# Patient Record
Sex: FEMALE | HISPANIC OR LATINO | URBAN - METROPOLITAN AREA
[De-identification: names, ages, dates, MRNs, and addresses within clinical notes are randomized per-mention and may not be internally consistent; named-entity substitution may affect disease eponyms.]

---

## 2024-09-08 ENCOUNTER — HOSPITAL ENCOUNTER (EMERGENCY)
Facility: HOSPITAL | Age: 54
Discharge: HOME | End: 2024-09-08
Attending: EMERGENCY MEDICINE
Payer: COMMERCIAL

## 2024-09-08 ENCOUNTER — APPOINTMENT (OUTPATIENT)
Dept: RADIOLOGY | Facility: HOSPITAL | Age: 54
End: 2024-09-08
Payer: COMMERCIAL

## 2024-09-08 VITALS
WEIGHT: 169.75 LBS | BODY MASS INDEX: 33.33 KG/M2 | DIASTOLIC BLOOD PRESSURE: 60 MMHG | SYSTOLIC BLOOD PRESSURE: 114 MMHG | TEMPERATURE: 98.2 F | OXYGEN SATURATION: 98 % | HEART RATE: 77 BPM | HEIGHT: 60 IN | RESPIRATION RATE: 16 BRPM

## 2024-09-08 DIAGNOSIS — R42 DIZZINESS: ICD-10-CM

## 2024-09-08 DIAGNOSIS — R51.9 ACUTE NONINTRACTABLE HEADACHE, UNSPECIFIED HEADACHE TYPE: Primary | ICD-10-CM

## 2024-09-08 DIAGNOSIS — M77.9 TENDONITIS: ICD-10-CM

## 2024-09-08 PROBLEM — M79.604 RIGHT LEG PAIN: Status: ACTIVE | Noted: 2024-09-08

## 2024-09-08 LAB
ALBUMIN SERPL-MCNC: 3.7 G/DL (ref 3.5–5)
ALP BLD-CCNC: 114 U/L (ref 35–125)
ALT SERPL-CCNC: 50 U/L (ref 5–40)
ANION GAP SERPL CALC-SCNC: 10 MMOL/L
APPEARANCE UR: CLEAR
AST SERPL-CCNC: 31 U/L (ref 5–40)
BASOPHILS # BLD AUTO: 0.04 X10*3/UL (ref 0–0.1)
BASOPHILS NFR BLD AUTO: 0.6 %
BILIRUB SERPL-MCNC: 0.3 MG/DL (ref 0.1–1.2)
BILIRUB UR STRIP.AUTO-MCNC: NEGATIVE MG/DL
BUN SERPL-MCNC: 7 MG/DL (ref 8–25)
CALCIUM SERPL-MCNC: 8.8 MG/DL (ref 8.5–10.4)
CHLORIDE SERPL-SCNC: 108 MMOL/L (ref 97–107)
CO2 SERPL-SCNC: 20 MMOL/L (ref 24–31)
COLOR UR: COLORLESS
CREAT SERPL-MCNC: 0.4 MG/DL (ref 0.4–1.6)
EGFRCR SERPLBLD CKD-EPI 2021: >90 ML/MIN/1.73M*2
EOSINOPHIL # BLD AUTO: 0.13 X10*3/UL (ref 0–0.7)
EOSINOPHIL NFR BLD AUTO: 1.8 %
ERYTHROCYTE [DISTWIDTH] IN BLOOD BY AUTOMATED COUNT: 13.6 % (ref 11.5–14.5)
GLUCOSE SERPL-MCNC: 152 MG/DL (ref 65–99)
GLUCOSE UR STRIP.AUTO-MCNC: NORMAL MG/DL
HCT VFR BLD AUTO: 40.1 % (ref 36–46)
HGB BLD-MCNC: 13 G/DL (ref 12–16)
IMM GRANULOCYTES # BLD AUTO: 0.01 X10*3/UL (ref 0–0.7)
IMM GRANULOCYTES NFR BLD AUTO: 0.1 % (ref 0–0.9)
KETONES UR STRIP.AUTO-MCNC: NEGATIVE MG/DL
LEUKOCYTE ESTERASE UR QL STRIP.AUTO: NEGATIVE
LYMPHOCYTES # BLD AUTO: 1.81 X10*3/UL (ref 1.2–4.8)
LYMPHOCYTES NFR BLD AUTO: 25.6 %
MCH RBC QN AUTO: 29.2 PG (ref 26–34)
MCHC RBC AUTO-ENTMCNC: 32.4 G/DL (ref 32–36)
MCV RBC AUTO: 90 FL (ref 80–100)
MONOCYTES # BLD AUTO: 0.4 X10*3/UL (ref 0.1–1)
MONOCYTES NFR BLD AUTO: 5.7 %
NEUTROPHILS # BLD AUTO: 4.67 X10*3/UL (ref 1.2–7.7)
NEUTROPHILS NFR BLD AUTO: 66.2 %
NITRITE UR QL STRIP.AUTO: NEGATIVE
NRBC BLD-RTO: 0 /100 WBCS (ref 0–0)
PH UR STRIP.AUTO: 5.5 [PH]
PLATELET # BLD AUTO: 194 X10*3/UL (ref 150–450)
POTASSIUM SERPL-SCNC: 4.5 MMOL/L (ref 3.4–5.1)
PROT SERPL-MCNC: 7.6 G/DL (ref 5.9–7.9)
PROT UR STRIP.AUTO-MCNC: NEGATIVE MG/DL
RBC # BLD AUTO: 4.45 X10*6/UL (ref 4–5.2)
RBC # UR STRIP.AUTO: NEGATIVE /UL
SODIUM SERPL-SCNC: 138 MMOL/L (ref 133–145)
SP GR UR STRIP.AUTO: 1
TROPONIN T SERPL-MCNC: <6 NG/L
UROBILINOGEN UR STRIP.AUTO-MCNC: NORMAL MG/DL
WBC # BLD AUTO: 7.1 X10*3/UL (ref 4.4–11.3)

## 2024-09-08 PROCEDURE — 36415 COLL VENOUS BLD VENIPUNCTURE: CPT | Performed by: CLINICAL NURSE SPECIALIST

## 2024-09-08 PROCEDURE — 96375 TX/PRO/DX INJ NEW DRUG ADDON: CPT

## 2024-09-08 PROCEDURE — 2500000004 HC RX 250 GENERAL PHARMACY W/ HCPCS (ALT 636 FOR OP/ED): Performed by: CLINICAL NURSE SPECIALIST

## 2024-09-08 PROCEDURE — 99285 EMERGENCY DEPT VISIT HI MDM: CPT | Mod: 25

## 2024-09-08 PROCEDURE — 96374 THER/PROPH/DIAG INJ IV PUSH: CPT

## 2024-09-08 PROCEDURE — 93971 EXTREMITY STUDY: CPT | Performed by: RADIOLOGY

## 2024-09-08 PROCEDURE — 71046 X-RAY EXAM CHEST 2 VIEWS: CPT

## 2024-09-08 PROCEDURE — 81003 URINALYSIS AUTO W/O SCOPE: CPT | Performed by: CLINICAL NURSE SPECIALIST

## 2024-09-08 PROCEDURE — 70450 CT HEAD/BRAIN W/O DYE: CPT | Performed by: RADIOLOGY

## 2024-09-08 PROCEDURE — 84484 ASSAY OF TROPONIN QUANT: CPT | Performed by: CLINICAL NURSE SPECIALIST

## 2024-09-08 PROCEDURE — 93971 EXTREMITY STUDY: CPT

## 2024-09-08 PROCEDURE — 96361 HYDRATE IV INFUSION ADD-ON: CPT

## 2024-09-08 PROCEDURE — 71046 X-RAY EXAM CHEST 2 VIEWS: CPT | Performed by: RADIOLOGY

## 2024-09-08 PROCEDURE — 2500000001 HC RX 250 WO HCPCS SELF ADMINISTERED DRUGS (ALT 637 FOR MEDICARE OP): Performed by: CLINICAL NURSE SPECIALIST

## 2024-09-08 PROCEDURE — 80053 COMPREHEN METABOLIC PANEL: CPT | Performed by: CLINICAL NURSE SPECIALIST

## 2024-09-08 PROCEDURE — 85025 COMPLETE CBC W/AUTO DIFF WBC: CPT | Performed by: CLINICAL NURSE SPECIALIST

## 2024-09-08 PROCEDURE — 70450 CT HEAD/BRAIN W/O DYE: CPT

## 2024-09-08 RX ORDER — METOCLOPRAMIDE HYDROCHLORIDE 5 MG/ML
10 INJECTION INTRAMUSCULAR; INTRAVENOUS ONCE
Status: COMPLETED | OUTPATIENT
Start: 2024-09-08 | End: 2024-09-08

## 2024-09-08 RX ORDER — DEXAMETHASONE SODIUM PHOSPHATE 10 MG/ML
10 INJECTION INTRAMUSCULAR; INTRAVENOUS ONCE
Status: COMPLETED | OUTPATIENT
Start: 2024-09-08 | End: 2024-09-08

## 2024-09-08 RX ORDER — MECLIZINE HCL 12.5 MG 12.5 MG/1
12.5 TABLET ORAL 3 TIMES DAILY PRN
Qty: 9 TABLET | Refills: 0 | Status: SHIPPED | OUTPATIENT
Start: 2024-09-08 | End: 2024-09-11

## 2024-09-08 RX ORDER — KETOROLAC TROMETHAMINE 10 MG/1
10 TABLET, FILM COATED ORAL EVERY 6 HOURS PRN
Qty: 20 TABLET | Refills: 0 | Status: SHIPPED | OUTPATIENT
Start: 2024-09-08 | End: 2024-09-13

## 2024-09-08 RX ORDER — DIPHENHYDRAMINE HYDROCHLORIDE 50 MG/ML
25 INJECTION INTRAMUSCULAR; INTRAVENOUS ONCE
Status: COMPLETED | OUTPATIENT
Start: 2024-09-08 | End: 2024-09-08

## 2024-09-08 RX ORDER — MECLIZINE HYDROCHLORIDE 25 MG/1
25 TABLET ORAL ONCE
Status: COMPLETED | OUTPATIENT
Start: 2024-09-08 | End: 2024-09-08

## 2024-09-08 ASSESSMENT — COLUMBIA-SUICIDE SEVERITY RATING SCALE - C-SSRS
1. IN THE PAST MONTH, HAVE YOU WISHED YOU WERE DEAD OR WISHED YOU COULD GO TO SLEEP AND NOT WAKE UP?: NO
6. HAVE YOU EVER DONE ANYTHING, STARTED TO DO ANYTHING, OR PREPARED TO DO ANYTHING TO END YOUR LIFE?: NO
2. HAVE YOU ACTUALLY HAD ANY THOUGHTS OF KILLING YOURSELF?: NO

## 2024-09-08 ASSESSMENT — PAIN DESCRIPTION - ONSET: ONSET: AWAKENED FROM SLEEP

## 2024-09-08 ASSESSMENT — PAIN DESCRIPTION - DESCRIPTORS: DESCRIPTORS: ACHING

## 2024-09-08 ASSESSMENT — PAIN SCALES - GENERAL
PAINLEVEL_OUTOF10: 3
PAINLEVEL_OUTOF10: 10 - WORST POSSIBLE PAIN

## 2024-09-08 ASSESSMENT — PAIN DESCRIPTION - PAIN TYPE: TYPE: ACUTE PAIN

## 2024-09-08 ASSESSMENT — PAIN DESCRIPTION - ORIENTATION: ORIENTATION: RIGHT

## 2024-09-08 ASSESSMENT — PAIN - FUNCTIONAL ASSESSMENT: PAIN_FUNCTIONAL_ASSESSMENT: 0-10

## 2024-09-08 ASSESSMENT — PAIN DESCRIPTION - PROGRESSION: CLINICAL_PROGRESSION: NOT CHANGED

## 2024-09-08 ASSESSMENT — PAIN DESCRIPTION - FREQUENCY: FREQUENCY: CONSTANT/CONTINUOUS

## 2024-09-08 NOTE — ED PROVIDER NOTES
Department of Emergency Medicine   ED  Provider Note  Admit Date/RoomTime: 9/8/2024  2:33 PM  ED Room: 03/03        History of Present Illness:  Chief Complaint   Patient presents with   • Dizziness     Dizziness since yesterday, right leg pain for past week         Ayah Carson is a 54 y.o. female migraines, diabetes presents to the emergency department with headache dizziness she feels like she walks like she is drunk.  Left jaw pain .  Denies fever chills cough congestion runny nose sore throat.  Patient reports she woke up with dizziness and headache yesterday.  Tried her migraine headache medication with no improvement.  She denies any chest pain or shortness of breath no abdominal pain nausea diarrhea.  Denies fall or injury.  Reports this headache is similar to her previous migraine headaches but she has never had dizziness or unsteady gait.  She denies pressure burning frequency of urination.  No recent upper respiratory infection.  No long distance travel complaining of right leg pain with intermittent swelling that is worse throughout the day.  No change in temperature color or sensation of the leg.  Denies difficulty moving arms or legs no weakness no change in her thought process.  This is not the worst headache of her life.  Did not come on all of a sudden.  She has had no fever no fall no meningeal signs    Review of Systems:   Pertinent positives and negatives are stated within HPI, all other systems reviewed and are negative.        --------------------------------------------- PAST HISTORY ---------------------------------------------  Past Medical History:  has no past medical history on file.  Past Surgical History:  has no past surgical history on file.  Social History:    Family History: family history is not on file.. Unless otherwise noted, family history is non contributory  The patient’s home medications have been reviewed.  Allergies:  Penicillin        ---------------------------------------------------PHYSICAL EXAM--------------------------------------    GENERAL APPEARANCE: Awake and alert.   VITAL SIGNS: As per the nurses' triage record.   HEENT: Normocephalic, atraumatic. Extraocular muscles are intact. Pupils equal round and reactive to light. Conjunctiva are pink. Negative scleral icterus. Mucous membranes are moist. Tongue in the midline. Pharynx was without erythema or exudates, uvula midline  NECK: Soft Nontender and supple, full gross ROM, no meningeal signs.  Clicking sensation felt with movement of the jaw.  Bilateral tympanic membranes pearly gray and intact.  No hemotympanum noted.  No erythema noted.    CHEST: Nontender to palpation. Clear to auscultation bilaterally. No rales, rhonchi, or wheezing.   HEART: S1, S2. Regular rate and rhythm. No murmurs, gallops or rubs.  Strong and equal pulses in the extremities.   ABDOMEN: Soft, nontender, nondistended, positive bowel sounds, no palpable masses.  MUSCULCSKELETAL: The calves are nontender to palpation. Full gross active range of motion. Ambulating on own with no acute difficulties  NEUROLOGICAL: Awake, alert and oriented x 3. Power intact in the upper and lower extremities. Sensation is intact to light touch in the upper and lower extremities.  Pushes and pulls are equal and strong.  No ataxia noted.  Horizontal nystagmus noted exasperate stare.  NIH is 0.  Test of skew negative  IMMUNOLOGICAL: No lymphatic streaking noted   DERM: No petechiae, rashes, or ecchymoses.          ------------------------- NURSING NOTES AND VITALS REVIEWED ---------------------------  The nursing notes within the ED encounter and vital signs as below have been reviewed by myself  BP 97/61   Pulse 71   Temp 36.8 °C (98.2 °F) (Oral)   Resp 18   Ht 1.524 m (5')   Wt 77 kg (169 lb 12.1 oz)   SpO2 95%   BMI 33.15 kg/m²     Oxygen Saturation Interpretation: 97% room air    The cardiac monitor revealed  sinus rhythm with a heart rate in the 80s as interpreted by me. The cardiac monitor was ordered secondary to the patient's heart rate and to monitor the patient for dysrhythmia.       The patient’s available past medical records and past encounters were reviewed.          -----------------------DIAGNOSTIC RESULTS------------------------  LABS:    Labs Reviewed   URINALYSIS WITH REFLEX CULTURE AND MICROSCOPIC - Abnormal       Result Value    Color, Urine Colorless (*)     Appearance, Urine Clear      Specific Gravity, Urine 1.004 (*)     pH, Urine 5.5      Protein, Urine NEGATIVE      Glucose, Urine Normal      Blood, Urine NEGATIVE      Ketones, Urine NEGATIVE      Bilirubin, Urine NEGATIVE      Urobilinogen, Urine Normal      Nitrite, Urine NEGATIVE      Leukocyte Esterase, Urine NEGATIVE     CBC WITH AUTO DIFFERENTIAL    WBC 7.1      nRBC 0.0      RBC 4.45      Hemoglobin 13.0      Hematocrit 40.1      MCV 90      MCH 29.2      MCHC 32.4      RDW 13.6      Platelets 194      Neutrophils % 66.2      Immature Granulocytes %, Automated 0.1      Lymphocytes % 25.6      Monocytes % 5.7      Eosinophils % 1.8      Basophils % 0.6      Neutrophils Absolute 4.67      Immature Granulocytes Absolute, Automated 0.01      Lymphocytes Absolute 1.81      Monocytes Absolute 0.40      Eosinophils Absolute 0.13      Basophils Absolute 0.04     COMPREHENSIVE METABOLIC PANEL   TROPONIN T SERIES, HIGH SENSITIVITY (0, 2 HR, 6 HR)    Narrative:     The following orders were created for panel order Troponin T Series, High Sensitivity (0, 2HR, 6HR).  Procedure                               Abnormality         Status                     ---------                               -----------         ------                     Serial Troponin, Initial...[[id number]]                                                 Serial Troponin, 2 Hour ...[[id number]]                                                   Please view results for these tests on the  individual orders.   URINALYSIS WITH REFLEX CULTURE AND MICROSCOPIC    Narrative:     The following orders were created for panel order Urinalysis with Reflex Culture and Microscopic.  Procedure                               Abnormality         Status                     ---------                               -----------         ------                     Urinalysis with Reflex C...[[id number]]  Abnormal            Final result               Extra Urine Gray Tube[[id number]]                                                         Please view results for these tests on the individual orders.   SERIAL TROPONIN, INITIAL ([facility])   EXTRA URINE GRAY TUBE   SERIAL TROPONIN,  2 HOUR ([facility])   SERIAL TROPONIN,  2 HOUR ([facility])       As interpreted by me, the above displayed labs are abnormal. All other labs obtained during this visit were within normal range or not returned as of this dictation.      EKG Interpretation  1656 EKG personally interpreted by me performed at 1430  Normal sinus rhythm ventricular at 77 normal axis and intervals no acute ischemic changes [EF]           XR chest 2 views   Final Result   No focal infiltrate or pneumothorax.        MACRO:   None.        Signed by: Kb Williamson 9/8/2024 4:12 PM   Dictation workstation:   [id number]      CT head wo IV contrast   Final Result   No evidence of acute cortical infarct or intracranial hemorrhage.        MACRO:   None             Signed by: Kb Williamson 9/8/2024 4:14 PM   Dictation workstation:   [id number]      Lower extremity venous duplex right   Final Result   No sonographic evidence for deep vein thrombosis within the evaluated   veins of the right lower extremity.        MACRO:   None        Signed by: Kb Williamson 9/8/2024 4:11 PM   Dictation workstation:   [id number]              XR chest 2 views   Final Result   No focal infiltrate or pneumothorax.        MACRO:   None.        Signed by: Kb Williamson 9/8/2024 4:12 PM   Dictation workstation:    [id number]      CT head wo IV contrast   Final Result   No evidence of acute cortical infarct or intracranial hemorrhage.        MACRO:   None             Signed by: Kb Williamson 9/8/2024 4:14 PM   Dictation workstation:   [id number]      Lower extremity venous duplex right   Final Result   No sonographic evidence for deep vein thrombosis within the evaluated   veins of the right lower extremity.        MACRO:   None        Signed by: Kb Williamson 9/8/2024 4:11 PM   Dictation workstation:   [id number]              ------------------------------ ED COURSE/MEDICAL DECISION MAKING----------------------  Medical Decision Making:   Exam: A medically appropriate exam performed, outlined above, given the known history and presentation.    History obtained from: Review of medical record nursing notes patient      Social Determinants of Health considered during this visit: Presents today with family patient is Japanese-speaking request that her daughter who is 23 years old interprets for her I did offer a hospital interpretation system patient has declined      PAST MEDICAL HISTORY/Chronic Conditions Affecting Care     has no past medical history on file.       CC/HPI Summary, Social Determinants of health, Records Reviewed, DDx, testing done/not done, ED Course, Reassessment, disposition considerations/shared decision making with patient, consults, disposition:   Presents with headache dizziness difficulty ambulating  Right leg pain  Plan  Decadron  Benadryl  Antivert  Reglan  Normal saline  CT head No evidence of acute cortical infarct or intracranial hemorrhage.   Chest x-ray No focal infiltrate or pneumothorax   Doppler right lower extremity No sonographic evidence for deep vein thrombosis within the evaluated  veins of the right lower extremity.  CBC  CMP  Troponin  Urine    Medical Decision Making/Differential Diagnosis:  Differentials include not limited to migraine versus atypical migraine versus TMJ versus  tension headache versus acute coronary syndrome atypical versus vertigo.  Versus hypoglycemia versus electrode abnormality versus dehydration versus anemia low suspicion for stroke NIH is 0.  Test of skew negative.  Horizontal nystagmus resolves with stare.  No meningeal signs noted this is not the worst headache of her life did not, like a thunderclap.  Reports symptoms are similar to her previous migraine.  Right lower extremity no sign of a septic joint full range of motion.  No calf redness warmth or tenderness noted.  No history of long distance travel.    White blood cell count 7.1  Hemoglobin 13  CMP  Troponin  NIH 0  Test of skew negative  Urine was negative for leukocytes nitrites ketones  Patient presented to the emergency department complaints of dizziness feeling like she was drunk left-sided headache and left jaw pain.  EKG per attending note normal sinus rhythm no ST elevation or arrhythmia noted.  No complaints of chest pain.  Troponin pending.  No elevation white blood cell count patient is not anemic.  Urine is not consistent with UTI.  CMP pending.  CT of the head showed no evidence of acute cortical infarct or intracranial hemorrhage.  Chest x-ray showed no focal infiltrate or pneumothorax.  Patient also complaining of intermittent swelling to her right leg intermittent pain.  Doppler study of the right lower extremity showed no evidence of deep thrombosis within the evaluated veins of the right lower extremity.  No sign of a septic joint.  Full range of motion no edema no redness or warmth.  Patient seen evaluated with attending physician Dr. Pettit  Workup still pending at this time.  Please see attending note for final impression disposition reevaluation  PROCEDURES  Unless otherwise noted below, none    Upon reevaluation patient reports her headache is resolved.  No further dizziness.  Is alert and oriented.  CONSULTS:   None      ED Course as of 09/19/24 1107   Sun Sep 08, 2024   1656 EKG  personally interpreted by me performed at 1430  Normal sinus rhythm ventricular at 77 normal axis and intervals no acute ischemic changes [EF]   1705 Patient reevaluated neurologically intact.  Denies headache at this time.  No further dizziness or feeling like she is drunk. [TB]      ED Course User Index  [EF] Mitra Pettit DO  [TB] ELIZABETH Hernandez-CNP         Diagnoses as of 09/19/24 1107   Acute nonintractable headache, unspecified headache type   Dizziness   Tendonitis         This patient has remained hemodynamically stable during their ED course.      Critical Care: none       Counseling:  The emergency provider has spoken with the patient family and discussed today’s results, in addition to providing specific details for the plan of care and counseling regarding the diagnosis and prognosis.  Questions are answered at this time and they are agreeable with the plan.         --------------------------------- IMPRESSION AND DISPOSITION ---------------------------------    IMPRESSION  1. Acute nonintractable headache, unspecified headache type    2. Dizziness    3. Right leg pain        DISPOSITION  Disposition: Pending   Patient condition is stable improved        NOTE: This report was transcribed using voice recognition software. Every effort was made to ensure accuracy; however, inadvertent computerized transcription errors may be present      SOLEDAD Hernandez  09/08/24 1715       SOLEDAD Hernandez  09/19/24 1107

## 2024-09-08 NOTE — ED TRIAGE NOTES
Daughter translating per patient preference. Patient states she has TMJ and the left side of her jaw hurts, states it is difficult to open and pain radiates to her head since yesterday. States she took her migraine medication with relief of her headache last night. States she has felt dizzy, like she's drunk, since yesterday morning. Patient ambulated without difficulty to room, gait steady. Patient states for the last week her right leg has been hurting as well. Denies recent illness, denies recent travel, denies recent injury.

## 2024-09-08 NOTE — Clinical Note
Ayah Carson was seen and treated in our emergency department on 9/8/2024.  She may return to work on 09/10/2024.  1-3 days if needed no driving until cleared by primary care physician     If you have any questions or concerns, please don't hesitate to call.      May Mancuso MD

## 2024-09-08 NOTE — Clinical Note
Ayah Carson was seen and treated in our emergency department on 9/8/2024.  She may return to work on 09/10/2024.  1-3 days if needed no driving until cleared by primary care physician     If you have any questions or concerns, please don't hesitate to call.      Mitra Pettit, DO

## 2024-09-08 NOTE — DISCHARGE INSTRUCTIONS
Change positions slowly  Increase fluids  No driving until cleared by primary care physician or neurologist due to dizziness  Return with any worsening symptoms or concerns  Continue with your migraine regiment  Go home and rest  Return with any worsening symptoms or concerns follow-up with primary care physician in 2 days for reevaluation  No blood clot noted in the leg today.  I suspect you have inflammation of your tendon which is causing pain.       Cambie de posición lentamente  Aumente la ingesta de líquidos  No conduzca hasta que murillo médico de atención primaria o neurólogo le dé el visto gallo debido a los mareos  Regrese si los síntomas empeoran o tiene inquietudes  Continúe con murillo régimen para la migraña  Vuelva a casa y descanse  Regrese si los síntomas empeoran o tiene inquietudes y realice addis visita de seguimiento con murillo médico de atención primaria en 2 días para addis nueva evaluación  No se observó ningún coágulo de bro en la hilda welch. Sospecho que tiene addis inflamación del tendón que le está causando dolor.